# Patient Record
Sex: FEMALE | Race: WHITE | URBAN - METROPOLITAN AREA
[De-identification: names, ages, dates, MRNs, and addresses within clinical notes are randomized per-mention and may not be internally consistent; named-entity substitution may affect disease eponyms.]

---

## 2018-12-11 ENCOUNTER — OP HISTORICAL/CONVERTED ENCOUNTER (OUTPATIENT)
Dept: OTHER | Age: 58
End: 2018-12-11

## 2022-10-28 NOTE — PATIENT INSTRUCTIONS
RESULT POLICY      If we have ordered testing for you, know that; \"NO NEWS IS GOOD NEWS! \"     It is our policy that we no longer call patients with results, nor do we  give test results over the phone. We schedule follow up appointments so that your results can be discussed in person. This allows you to address any questions you have regarding the results. If you choose to go to an imaging center outside of [facility], it is your responsibility to bring imaging report and disc to follow up appointment. If something of concern is revealed on your test, we will contact you to discuss the matter and if needed schedule a sooner follow up appointment. Additionally, results may be found by using the My Chart feature and one of our patient service representatives at the  can give you instructions on how to access this feature to utilize our electronic medical record system. Thank you for your understanding. [facility]   Statement to Patients  April 1, 2014      In an effort to ensure the large volume of patient prescription refills is processed in the most efficient and expeditious manner, we are asking our patients to assist us by calling your Pharmacy for all prescription refills, this will include also your  Mail Order Pharmacy. The pharmacy will contact our office electronically to continue the refill process. Please do not wait until the last minute to call your pharmacy. We need at least 48 hours (2days) to fill prescriptions. We also encourage you to call your pharmacy before going to  your prescription to make sure it is ready. With regard to controlled substance prescription refill requests (narcotic refills) that need to be picked up at our office, we ask your cooperation by providing us with at least 72 hours (3days) notice that you will need a refill.     We will not refill narcotic prescription refill requests after 4:00pm on any weekday, Monday through Thursday, or after 2:00pm on Fridays, or on the weekends. We encourage everyone to explore another way of getting your prescription refill request processed using [employer], our patient web portal through our electronic medical record system. [employer] is an efficient and effective way to communicate your medication request directly to the office and  downloadable as an willam on your smart phone . [employer] also features a review functionality that allows you to view your medication list as well as leave messages for your physician. Are you ready to get connected? If so please review the attatched instructions or speak to any of our staff to get you set up right away! Thank you so much for your cooperation. Should you have any questions please contact our Practice Administrator.

## 2022-11-04 ENCOUNTER — OFFICE VISIT (OUTPATIENT)
Dept: NEUROLOGY | Age: 62
End: 2022-11-04
Payer: COMMERCIAL

## 2022-11-04 VITALS
HEART RATE: 89 BPM | SYSTOLIC BLOOD PRESSURE: 120 MMHG | OXYGEN SATURATION: 96 % | WEIGHT: 112.6 LBS | DIASTOLIC BLOOD PRESSURE: 70 MMHG | BODY MASS INDEX: 18.76 KG/M2 | HEIGHT: 65 IN | RESPIRATION RATE: 20 BRPM

## 2022-11-04 DIAGNOSIS — G43.111 INTRACTABLE COMPLICATED MIGRAINE WITH STATUS MIGRAINOSUS: ICD-10-CM

## 2022-11-04 DIAGNOSIS — R51.9 INCREASED FREQUENCY OF HEADACHES: ICD-10-CM

## 2022-11-04 DIAGNOSIS — Z98.890 S/P COIL EMBOLIZATION OF CEREBRAL ANEURYSM: Primary | ICD-10-CM

## 2022-11-04 DIAGNOSIS — H53.9 VISUAL DISTURBANCE: ICD-10-CM

## 2022-11-04 PROCEDURE — 99204 OFFICE O/P NEW MOD 45 MIN: CPT | Performed by: PSYCHIATRY & NEUROLOGY

## 2022-11-04 RX ORDER — ATOGEPANT 60 MG/1
60 TABLET ORAL DAILY
Qty: 16 TABLET | Refills: 0 | Status: SHIPPED | COMMUNITY
Start: 2022-11-04

## 2022-11-04 RX ORDER — ATOGEPANT 60 MG/1
1 TABLET ORAL DAILY
Qty: 30 TABLET | Refills: 5 | Status: SHIPPED | OUTPATIENT
Start: 2022-11-04

## 2022-11-04 RX ORDER — TOPIRAMATE 25 MG/1
50 TABLET ORAL 2 TIMES DAILY
COMMUNITY
Start: 2022-10-06

## 2022-11-04 NOTE — PROGRESS NOTES
[facility] and [address] at [facility] at [facility] [address], [address] [place] [id number] [address] [facility] [address]  [phone] Office  [phone]           Referring: RAOUL Patricia      No chief complaint on file. 80-year-old lady who presents today company by her  for neurologic consultation regarding worsening headaches as well as visual disturbance. She tells me that she has had headaches since she was in elementary school and she typically will get a severe headache right before she started her menses. She would have strokelike symptoms with them including inability to speak as well has numbness of her tongue and her extremities. After she went through menopause they seem to get a little better and now they are worsening. She notes that she has them a retro-orbital or occipital.  Intense pain. She gets an aura of squiggly lines prior to the headache but her lead time is almost immediate. As soon as she has the aura she gets the headache. She has had nausea and vomiting with these. Some photophobia. Now she has almost daily headache with at least 7 intense headaches per month. She has been using over-the-counter Excedrin and Goody's. They last for hours and she gets rebounding. She has tried multiple medications in the past over the years and the one she recalls for prevention are Inderal Elavil and more recently Topamax. She has tried Imitrex injections as well as other abortives including Midrin. She has not tried any of the newer medicines. Interestingly she was found to have an aneurysm last year cerebral type that was coiled. With these increases in headaches she is now having a visual disturbance where she feels like the vision particularly in her left eye is distorted. Her aneurysm was in the left ICA terminus.   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 She is also having some cognitive clouding. Kindly forwarded records find office visit August 24, 2022 with nurse practitioner Opal Knowles where the patient was on 25 mg Topamax twice daily as well as vitamin D, Tylenol, and melatonin. Laboratory analysis from the same date  Vitamin D normal  Metabolic panel normal  Lipid panel with an LDL of 121  TSH normal  CBC unremarkable  Patient was having migraine with increased frequency. Also history of brain aneurysm. Topamax increased to 50mg bid and she was referred to neurology. [facility] records finds a note with Dr. Hernan Pollard of neuro interventional surgery dated March 22, 2022. Plan was for diagnostic catheter cerebral angiography. Notes indicate patient had left ICA terminus embolization September 9, 2021. Past Medical History:   Diagnosis Date    Brain aneurysm        History reviewed. No pertinent surgical history. Current Outpatient Medications   Medication Sig Dispense Refill    topiramate (TOPAMAX) 25 mg tablet Take 50 mg by mouth two (2) times a day. Not on File    Social History     Tobacco Use    Smoking status: Every Day     Types: Cigarettes    Smokeless tobacco: Never   Vaping Use    Vaping Use: Some days    Substances: Nicotine    Devices: Disposable   Substance Use Topics    Alcohol use: Yes     Comment: occ    Drug use: Never       Family History   Problem Relation Age of Onset    Cancer Mother     Cancer Father        Review of Systems  Pertinent positives and negatives as noted. Examination  Visit Vitals  Pulse 89   Resp 20   Ht 5' 5.05\" (1.652 m)   Wt 51.1 kg (112 lb 9.6 oz)   SpO2 96%   BMI 18.71 kg/m²     Neurologically, she is awake, alert, and oriented with normal speech and language. Her cognition is normal.    Intact cranial nerves 2-12. No nystagmus. Disk margins are flat bilaterally. She has normal bulk and tone. She has no abnormal movement.   She has no pronation or drift. She generates full strength in the upper and lower extremities to direct confrontational testing. Reflexes are symmetrical in the upper and lower extremities bilaterally. No pathologic reflexes are elicited. Finger nose finger and rapid alternating movements are normal.  Steady gait. Impression/Plan  Worsening migraine headaches as well as now daily headaches  Visual disturbance  Status post coiling of cerebral aneurysm  Given the increase in headache frequency as well as the visual disturbance we will get CTA to evaluate for patency of the embolization and to evaluate for any vascular/anatomic abnormality that could be causing this  In terms of the migraines stop Topamax  Start David Fontana for preventative therapy  Ubrelvy for abortive therapy. Avoid triptans, DHE and other vasoactive substances secondary to the complicated nature of her migraines i.e. migraine with strokelike symptoms as these medications are contraindicated  Discussed mechanism, side effect potential etc.  We will have her follow-up in 3 months but certainly be in touch regarding CTA results    Ahsan Mayers MD          This note was created using voice recognition software. Despite editing, there may be syntax errors.

## 2022-11-04 NOTE — PROGRESS NOTES
No chief complaint on file.       Visit Vitals  Pulse 89   Resp 20   Ht 5' 5.05\" (1.652 m)   Wt 112 lb 9.6 oz (51.1 kg)   SpO2 96%   BMI 18.71 kg/m²

## 2022-11-10 ENCOUNTER — TELEPHONE (OUTPATIENT)
Dept: NEUROLOGY | Age: 62
End: 2022-11-10

## 2022-11-10 NOTE — TELEPHONE ENCOUNTER
Patient called and stated she needs a PA for the following medications Che Yeh.       Please contact

## 2022-11-14 ENCOUNTER — HOSPITAL ENCOUNTER (OUTPATIENT)
Dept: CT IMAGING | Age: 62
Discharge: HOME | End: 2022-11-14
Attending: PSYCHIATRY & NEUROLOGY
Payer: COMMERCIAL

## 2022-11-14 DIAGNOSIS — Z98.890 S/P COIL EMBOLIZATION OF CEREBRAL ANEURYSM: ICD-10-CM

## 2022-11-14 DIAGNOSIS — R51.9 INCREASED FREQUENCY OF HEADACHES: ICD-10-CM

## 2022-11-14 DIAGNOSIS — H53.9 VISUAL DISTURBANCE: ICD-10-CM

## 2022-11-14 PROCEDURE — 74011000636 HC RX REV CODE- 636: Performed by: PSYCHIATRY & NEUROLOGY

## 2022-11-14 PROCEDURE — 70498 CT ANGIOGRAPHY NECK: CPT

## 2022-11-14 RX ADMIN — IOPAMIDOL 100 ML: 755 INJECTION, SOLUTION INTRAVENOUS at 17:13

## 2022-11-15 NOTE — TELEPHONE ENCOUNTER
RE:Qulipta  Key: [id number]        RE:Ubrelvy   Key: [id number]      Rcvd PA request added and submitted via CMM awaiting update

## 2022-11-17 ENCOUNTER — TELEPHONE (OUTPATIENT)
Dept: NEUROLOGY | Age: 62
End: 2022-11-17

## 2022-12-06 NOTE — TELEPHONE ENCOUNTER
Kavitha Singh  Key: [id number]      [facility] notification from [place] that medication has been approved from 11/15/2022-02/13/2023      Letter scanned in

## 2023-01-20 ENCOUNTER — OFFICE VISIT (OUTPATIENT)
Dept: NEUROLOGY | Age: 63
End: 2023-01-20
Payer: COMMERCIAL

## 2023-01-20 VITALS
RESPIRATION RATE: 18 BRPM | DIASTOLIC BLOOD PRESSURE: 70 MMHG | SYSTOLIC BLOOD PRESSURE: 116 MMHG | WEIGHT: 114.4 LBS | HEART RATE: 87 BPM | HEIGHT: 65 IN | BODY MASS INDEX: 19.06 KG/M2 | OXYGEN SATURATION: 97 %

## 2023-01-20 DIAGNOSIS — D32.9 MENINGIOMA (HCC): ICD-10-CM

## 2023-01-20 DIAGNOSIS — H53.9 VISUAL DISTURBANCE: Primary | ICD-10-CM

## 2023-01-20 DIAGNOSIS — Z98.890 S/P COIL EMBOLIZATION OF CEREBRAL ANEURYSM: ICD-10-CM

## 2023-01-20 DIAGNOSIS — G43.111 INTRACTABLE COMPLICATED MIGRAINE WITH STATUS MIGRAINOSUS: ICD-10-CM

## 2023-01-20 NOTE — PROGRESS NOTES
Rm    Chief Complaint   Patient presents with    Migraine     Follow up    Blurred Vision    Ringing in Ear        Visit Vitals  /70   Pulse 87   Resp 18   Ht 5' 5\" (1.651 m)   Wt 114 lb 6.4 oz (51.9 kg)   SpO2 97%   BMI 19.04 kg/m²        1. Have you been to the ER, urgent care clinic since your last visit? Hospitalized since your last visit? No    2. Have you seen or consulted any other health care providers outside of the [address] [place] [address] since your last visit? Include any pap smears or colon screening. No     Health Maintenance Due   Topic Date Due    Hepatitis C Screening  Never done    Depression Screen  Never done    COVID-19 Vaccine (1) Never done    Pneumococcal 0-64 years (1 - PCV) Never done    DTaP/Tdap/Td series (1 - Tdap) Never done    Cervical cancer screen  Never done    Lipid Screen  Never done    Colorectal Cancer Screening Combo  Never done    Shingles Vaccine (1 of 2) Never done    Breast Cancer Screen Mammogram  Never done    Flu Vaccine (1) Never done        3 most recent PHQ Screens 1/20/2023   Little interest or pleasure in doing things Not at all   Feeling down, depressed, irritable, or hopeless Not at all   Total Score PHQ 2 0        No flowsheet data found. No flowsheet data found.

## 2023-01-21 NOTE — PROGRESS NOTES
Lokesh Chacon is a 58 y.o. female who presents with the following  Chief Complaint   Patient presents with    Migraine     Follow up    [address] in [place]       HPI    Fu for migraines   She does state the Qulipta 60 mg helps singificantly   She uses Ubrelvy PRN and this helps too. She has seen a significant drop at migraines  There will located usually unilaterally  She has hypersensitivity and eye pain  Thigh pain still consists of sharp pains in the left thigh  Her sister did notice her left eye was not symmetrical.   She had a CTA which was good with her coil. She has a significant meningioma. She is wondering if this is causing pressure cause her left eye to be worse. She also has tinnitus which is significantly worse. 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 Not on File    Current Outpatient Medications   Medication Sig    topiramate (TOPAMAX) 25 mg tablet Take 50 mg by mouth two (2) times a day. atogepant (Qulipta) 60 mg tab Take 1 Tablet by mouth daily. ubrogepant (UBRELVY) 100 mg tablet 1 at HA onset and repeat in 2 hours x 1 prn  Max 2 tabs/24 hours    atogepant (Qulipta) 60 mg tab Take 60 mg by mouth daily. No current facility-administered medications for this visit. Social History     Tobacco Use   Smoking Status Every Day    Types: Cigarettes   Smokeless Tobacco Never       Past Medical History:   Diagnosis Date    Brain aneurysm        No past surgical history on file.     Family History   Problem Relation Age of Onset    Cancer Mother     Cancer Father        Social History     Socioeconomic History    Marital status:    Tobacco Use    Smoking status: Every Day     Types: Cigarettes    Smokeless tobacco: Never   Vaping Use    Vaping Use: Some days    Substances: Nicotine    Devices: Disposable   Substance and Sexual Activity    Alcohol use: Yes     Comment: occ    Drug use: Never       Review of Systems   Eyes: Positive for blurred vision, photophobia and pain. Negative for double vision. Gastrointestinal:  Positive for nausea. Negative for vomiting. Neurological:  Positive for dizziness and headaches. Remainder of comprehensive review is negative. Physical Exam :    Visit Vitals  /70   Pulse 87   Resp 18   Ht 5' 5\" (1.651 m)   Wt 51.9 kg (114 lb 6.4 oz)   SpO2 97%   BMI 19.04 kg/m²       General: Well defined, nourished, and groomed individual in no acute distress. Musculoskeletal: Extremities revealed no edema and had full range of motion of joints. Psych: Good mood and bright affect    NEUROLOGICAL EXAMINATION:    Mental Status: Alert and oriented to person, place, and time    Cranial Nerves:    II, III, IV, VI: Visual acuity grossly intact. Visual fields are normal.    Pupils are equal, round, and reactive to light and accommodation. Extra-ocular movements are full and fluid. Fundoscopic exam was benign, no ptosis or nystagmus. V-XII: Hearing is grossly intact. Facial features are symmetric, with normal sensation and strength. The palate rises symmetrically and the tongue protrudes midline. Sternocleidomastoids 5/5. Motor Examination: Normal tone, bulk, and strength, 5/5 muscle strength throughout. Coordination: Finger to nose was normal. No resting or intention tremor    Gait and Station: Steady while walking. Normal arm swing. No pronator drift. No muscle wasting or fasiculations noted. Reflexes: DTRs 2+ throughout. No results found for this or any previous visit. Orders Placed This Encounter    MRI BRAIN W WO CONT     Standing Status:   Future     Standing Expiration Date:   2/20/2024     Order Specific Question:   STAT Creatinine as indicated     Answer:   Yes       1. Visual disturbance    2. S/P coil embolization of cerebral aneurysm    3. Intractable complicated migraine with status migrainosus    4.  Meningioma (HCC)        Keep Qulipta 60 mg prevention Keep Raphael Schaumann for PRN   MRI brain to evaluate meningioma further   CTA looked good.    Keep track of HARO             This note will not be viewable in [employer]

## 2023-01-30 ENCOUNTER — HOSPITAL ENCOUNTER (OUTPATIENT)
Dept: MRI IMAGING | Age: 63
Discharge: HOME | End: 2023-01-30
Attending: NURSE PRACTITIONER
Payer: COMMERCIAL

## 2023-01-30 DIAGNOSIS — G43.111 INTRACTABLE COMPLICATED MIGRAINE WITH STATUS MIGRAINOSUS: ICD-10-CM

## 2023-01-30 DIAGNOSIS — Z98.890 S/P COIL EMBOLIZATION OF CEREBRAL ANEURYSM: ICD-10-CM

## 2023-01-30 DIAGNOSIS — D32.9 MENINGIOMA (HCC): ICD-10-CM

## 2023-01-30 DIAGNOSIS — H53.9 VISUAL DISTURBANCE: ICD-10-CM

## 2023-01-30 PROCEDURE — 74011250636 HC RX REV CODE- 250/636: Performed by: NURSE PRACTITIONER

## 2023-01-30 PROCEDURE — A9576 INJ PROHANCE MULTIPACK: HCPCS | Performed by: NURSE PRACTITIONER

## 2023-01-30 PROCEDURE — 70553 MRI BRAIN STEM W/O & W/DYE: CPT

## 2023-01-30 RX ADMIN — GADOTERIDOL 10 ML: 279.3 INJECTION, SOLUTION INTRAVENOUS at 08:51

## 2023-03-09 ENCOUNTER — TELEPHONE (OUTPATIENT)
Dept: NEUROLOGY | Age: 63
End: 2023-03-09

## 2023-03-09 ENCOUNTER — OFFICE VISIT (OUTPATIENT)
Dept: NEUROLOGY | Age: 63
End: 2023-03-09
Payer: COMMERCIAL

## 2023-03-09 VITALS
TEMPERATURE: 97.8 F | RESPIRATION RATE: 20 BRPM | HEART RATE: 85 BPM | SYSTOLIC BLOOD PRESSURE: 140 MMHG | OXYGEN SATURATION: 98 % | DIASTOLIC BLOOD PRESSURE: 86 MMHG

## 2023-03-09 DIAGNOSIS — D32.9 MENINGIOMA (HCC): ICD-10-CM

## 2023-03-09 DIAGNOSIS — H53.9 VISUAL DISTURBANCE: ICD-10-CM

## 2023-03-09 DIAGNOSIS — G43.111 INTRACTABLE COMPLICATED MIGRAINE WITH STATUS MIGRAINOSUS: Primary | ICD-10-CM

## 2023-03-09 PROCEDURE — 99214 OFFICE O/P EST MOD 30 MIN: CPT

## 2023-03-09 RX ORDER — RIMEGEPANT SULFATE 75 MG/75MG
75 TABLET, ORALLY DISINTEGRATING ORAL
Qty: 4 TABLET | Refills: 0 | Status: SHIPPED | COMMUNITY
Start: 2023-03-09 | End: 2023-03-09

## 2023-03-09 RX ORDER — GALCANEZUMAB 120 MG/ML
240 INJECTION, SOLUTION SUBCUTANEOUS
Qty: 1 EACH | Refills: 0 | Status: SHIPPED | COMMUNITY
Start: 2023-03-09

## 2023-03-09 RX ORDER — GALCANEZUMAB 120 MG/ML
120 INJECTION, SOLUTION SUBCUTANEOUS
Qty: 1 EACH | Refills: 3 | Status: SHIPPED | OUTPATIENT
Start: 2023-03-09

## 2023-03-09 RX ORDER — RIMEGEPANT SULFATE 75 MG/75MG
75 TABLET, ORALLY DISINTEGRATING ORAL
Qty: 8 TABLET | Refills: 3 | Status: SHIPPED | OUTPATIENT
Start: 2023-03-09 | End: 2023-03-09

## 2023-03-09 NOTE — PROGRESS NOTES
Lost vision yesterday during a meeting, couldn't see for about 40 mins in her left eye   Migraines, she took the Dusty and neither of them did anything

## 2023-03-09 NOTE — PROGRESS NOTES
Ashley Alegria is a 58 y.o. female who presents with the following  Chief Complaint   Patient presents with    Follow-up     Migraines, eye pain, blurriness- lost vision in her eye lasted about 40 mins        HPI  Patient here today with her sister for migraine follow-up. Patient was last seen January 20, 2023 by Jessica Gandara NP. Patient had been on Qulipta 60 mg daily for migraine prevention and Ubrelvy 100 mg for rescue. These 2 medications were helping to significantly drop the number of migraines that she was having. She was complaining of hypersensitivity and eye pain. She has a meningioma and was wondering if that could be the cause of her left eye pain. Nadira Nina did an MRI of the brain and assured the patient that the meningioma is not causing any type of movement or shifting that we would worry about. She should have a repeat scan every 1 to 2 years. Today the patient states that yesterday she was in a meeting at work when all of a sudden she lost vision in her left eye for about 40 minutes. This upset her tremulously. She stated that she took her Costa Dnea and Thereasa Yoselin to see if it would help the situation. She states that she is always had visual disturbances such as squiggly lines with the left eye with her headaches and even without headaches. She is taking the Qulipta 60 mg daily however is still having 2 or 3 severe migraines a month as well as just the visual disturbances without the headaches at least 1-2 times every week. This really bothers her because she is a nurse and gives injections to patient's and when she has trouble with her vision this makes it very difficult and unsafe. She recalls trying Inderal, Elavil, and Topamax for prevention in the past and she has tried Imitrex injections as well as other abortives including Midrin in the past. Because the patient has complicated migraines, she is not a candidate for triptans nor DHE's for rescue therapy.    Allergies   Allergen Reactions    Robaxin [Methocarbamol] Other (comments)     Burn on her forarm        Current Outpatient Medications   Medication Sig    rimegepant (Nurtec ODT) 75 mg disintegrating tablet Take 1 Tablet by mouth once as needed for Migraine for up to 1 dose. Indications: a migraine headache    galcanezumab-gnlm (Emgality Pen) 120 mg/mL injection 2 mL by SubCUTAneous route every thirty (30) days. Indications: migraine prevention    galcanezumab-gnlm (Emgality Pen) 120 mg/mL injection 1 mL by SubCUTAneous route every thirty (30) days. Indications: migraine prevention    rimegepant (Nurtec ODT) 75 mg disintegrating tablet Take 1 Tablet by mouth once as needed for Migraine for up to 1 dose. Indications: a migraine headache    ubrogepant (UBRELVY) 100 mg tablet 1 at HA onset and repeat in 2 hours x 1 prn  Max 2 tabs/24 hours     No current facility-administered medications for this visit. Social History     Tobacco Use   Smoking Status Every Day    Types: Cigarettes   Smokeless Tobacco Never       Past Medical History:   Diagnosis Date    Brain aneurysm        No past surgical history on file. Family History   Problem Relation Age of Onset    Cancer Mother     Cancer Father        Social History     Socioeconomic History    Marital status:    Tobacco Use    Smoking status: Every Day     Types: Cigarettes    Smokeless tobacco: Never   Vaping Use    Vaping Use: Some days    Substances: Nicotine    Devices: Disposable   Substance and Sexual Activity    Alcohol use: Yes     Comment: occ    Drug use: Never       Review of Systems   Constitutional: Negative. Eyes:         Peripheral vision loss   Neurological:  Positive for headaches. Psychiatric/Behavioral:  The patient is nervous/anxious. Remainder of comprehensive review is negative.      Physical Exam :    Visit Vitals  BP (!) 140/86 (BP 1 Location: Left upper arm, BP Patient Position: Sitting, BP Cuff Size: Adult)   Pulse 85   Temp 97.8 °F (36.6 °C) Resp 20   SpO2 98%       General: Well defined, nourished, and groomed individual in no acute distress. Neck: Supple, nontender, no bruits, no pain with resistance to active range of motion. Musculoskeletal: Extremities revealed no edema and had full range of motion of joints. Psych: Good mood and bright affect    NEUROLOGICAL EXAMINATION:    Mental Status: Alert and oriented to person, place, and time    Cranial Nerves:    II, III, IV, VI: Visual acuity grossly intact. Visual fields are normal.    Pupils are equal, round, and reactive to light and accommodation. Extra-ocular movements are full and fluid. Fundoscopic exam was benign, no ptosis or nystagmus. V-XII: Hearing is grossly intact. Facial features are symmetric, with normal sensation and strength. The palate rises symmetrically and the tongue protrudes midline. Sternocleidomastoids 5/5. Motor Examination: Normal tone, bulk, and strength, 5/5 muscle strength throughout. Coordination: Finger to nose was normal. No resting or intention tremor    Gait and Station: Steady while walking. Normal arm swing. No pronator drift. No muscle wasting or fasiculations noted. Reflexes: DTRs 2+ throughout. No results found for this or any previous visit. Orders Placed This Encounter    rimegepant (Nurtec ODT) 75 mg disintegrating tablet     Sig: Take 1 Tablet by mouth once as needed for Migraine for up to 1 dose. Indications: a migraine headache     Dispense:  4 Tablet     Refill:  0    galcanezumab-gnlm (Emgality Pen) 120 mg/mL injection     Si mL by SubCUTAneous route every thirty (30) days. Indications: migraine prevention     Dispense:  1 Each     Refill:  0    galcanezumab-gnlm (Emgality Pen) 120 mg/mL injection     Si mL by SubCUTAneous route every thirty (30) days.  Indications: migraine prevention     Dispense:  1 Each     Refill:  3    rimegepant (Nurtec ODT) 75 mg disintegrating tablet     Sig: Take 1 Tablet by mouth once as needed for Migraine for up to 1 dose. Indications: a migraine headache     Dispense:  8 Tablet     Refill:  3       1. Intractable complicated migraine with status migrainosus    2. Visual disturbance    3. Meningioma [facility])      Spoke with Dr. Magalie Hagen about this patient due to the fact that she lost vision in her left eye yesterday for 40 minutes and because she has a meningioma. Dr. Magalie Hagen feels that this patient is still just suffering from complicated migraine and nothing more serious. He suggested that we switch her from Costa Dena to an injectable which we will do today. Avoid triptans, DHE and other vasoactive substances secondary to the complicated nature of her migraines i.e. migraine with strokelike symptoms as these medications are contraindicated    Stop Qulipta 60 mg daily for migraine prevention and start Emgality 120 mg monthly for migraine prevention. Provided a sample for her to take with her today. Discussed potential side effects. Start Nurtec ODT 75 mg as needed for migraine rescue. Can take 1 x in 24 hours. Provided samples and a co-pay card. Follow-up in about 9 weeks to assess the medication.               This note will not be viewable in [facility]

## 2023-03-14 ENCOUNTER — TELEPHONE (OUTPATIENT)
Dept: NEUROLOGY | Age: 63
End: 2023-03-14

## 2023-03-14 NOTE — TELEPHONE ENCOUNTER
Sent patient a mychart msg letting her know her Emgality was approved. Please reach out to her pharmacy.

## 2023-03-14 NOTE — TELEPHONE ENCOUNTER
Emgality- Lucia MEHTA Case: [id number]  3/14/2023 - 6/12/2023   Sent to [address] [place] [address] to Tresa Ma

## 2023-06-12 ENCOUNTER — TELEPHONE (OUTPATIENT)
Age: 63
End: 2023-06-12

## 2023-06-21 ENCOUNTER — TELEPHONE (OUTPATIENT)
Age: 63
End: 2023-06-21

## 2023-07-05 NOTE — TELEPHONE ENCOUNTER
Pt had been asking for PA since beginning of June, policy ended 0/85/32. Will hopefully be getting new policy within the next few weeks. Set aside sample for pt to p/u at office to supplement until new policy takes effect.

## 2024-03-05 ENCOUNTER — TELEPHONE (OUTPATIENT)
Age: 64
End: 2024-03-05

## 2024-03-05 DIAGNOSIS — G43.111 MIGRAINE WITH AURA, INTRACTABLE, WITH STATUS MIGRAINOSUS: Primary | ICD-10-CM

## 2024-03-11 NOTE — TELEPHONE ENCOUNTER
RE:Nurtec  Key: [id number]         Rcvd the following message via CMM:    Patient not eligible due to non payment of premium.Please notify patient to contact their plan         Nurse notified

## 2024-03-12 NOTE — TELEPHONE ENCOUNTER
Re: Nurte    Last PA rep processed with [facility] script but PA needs to be submitted to [facility]. There also is no current script, used link and last script  24. Sent request for new script to be added

## 2024-10-16 ENCOUNTER — OFFICE VISIT (OUTPATIENT)
Age: 64
End: 2024-10-16
Payer: COMMERCIAL

## 2024-10-16 VITALS — DIASTOLIC BLOOD PRESSURE: 82 MMHG | SYSTOLIC BLOOD PRESSURE: 128 MMHG | RESPIRATION RATE: 20 BRPM

## 2024-10-16 DIAGNOSIS — D32.9 BENIGN NEOPLASM OF MENINGES, UNSPECIFIED (HCC): ICD-10-CM

## 2024-10-16 DIAGNOSIS — G43.111 MIGRAINE WITH AURA, INTRACTABLE, WITH STATUS MIGRAINOSUS: Primary | ICD-10-CM

## 2024-10-16 PROCEDURE — 99214 OFFICE O/P EST MOD 30 MIN: CPT

## 2024-10-16 RX ORDER — UBROGEPANT 100 MG/1
100 TABLET ORAL PRN
Qty: 16 TABLET | Refills: 5 | Status: SHIPPED | OUTPATIENT
Start: 2024-10-16

## 2024-10-16 RX ORDER — GALCANEZUMAB 120 MG/ML
120 INJECTION, SOLUTION SUBCUTANEOUS
Qty: 1 ML | Refills: 5 | Status: SHIPPED | OUTPATIENT
Start: 2024-10-16

## 2024-10-16 ASSESSMENT — ENCOUNTER SYMPTOMS: PHOTOPHOBIA: 1

## 2024-10-16 NOTE — PROGRESS NOTES
October 4th ER visit   Had 2 falls and after they did scan   Told her the meningioma had grown   She has the report on her Kwanjit for their computer system that she can bring up on her phone            
Hearing is grossly intact. Facial features are symmetric, with normal sensation and strength. The palate rises symmetrically and the tongue protrudes midline.     Motor Examination: Normal tone, bulk, and strength, 5/5 muscle strength throughout.     Coordination: No resting or intention tremor    Gait and Station: Steady while walking. Normal arm swing. No pronator drift. No muscle wasting or fasiculations noted.     Orders Placed This Encounter    Galcanezumab-gnlm (EMGALITY) 120 MG/ML SOAJ     Sig: Inject 120 mg into the skin every 30 days     Dispense:  1 mL     Refill:  5    Ubrogepant (UBRELVY) 100 MG TABS     Sig: Take 100 mg by mouth as needed (Take 1 pill at migraine onset. May repeat dose x 1 after 2 hours if still needed.)     Dispense:  16 tablet     Refill:  5        1. Migraine with aura, intractable, with status migrainosus    2. Benign neoplasm of meninges, unspecified (HCC)    Did advise the patient that we will attempt to get her back onto Emgality 120 mg monthly for migraine prevention and Ubrelvy 100 mg for rescue therapy.  Did give her samples of both meds today and a co-pay card for Emgality.  Patient will think about whether or not she wants to see neurosurgery about the meningioma increase or not.  Patient will follow-up in 4 months    Return in about 4 months (around 2/16/2025).

## 2024-10-21 ENCOUNTER — TELEPHONE (OUTPATIENT)
Age: 64
End: 2024-10-21

## 2024-10-21 NOTE — TELEPHONE ENCOUNTER
REUbrelvy 100 mg tablet pa request sent to [facility] via [facility]    Case ID: [id number]       CaseId:[id number]    Status approved     Coverage Start Date:09/21/2024     Coverage End Date:10/21/2025     Details in the referral tab     FYI to nurse

## 2025-01-27 NOTE — TELEPHONE ENCOUNTER
[employer]- Key: [id number]- PA Case ID: [id number]    PA Case: [id number]  3/15/2023 - 3/14/2024  Called into [address] to [place]      RX needs to be re-written, currently reads: Frequency: ONCE PRN for Mirgraine, which caused rx to cancelled same day; dispense quantity: 4 Tablet, the package comes in 8 for abortive, 16 for preventive. Thank you!
Sent patient a mychart msg letting her know her medication was approved.
I have personally seen and examined the patient. I have collaborated with and supervised the